# Patient Record
Sex: FEMALE | URBAN - METROPOLITAN AREA
[De-identification: names, ages, dates, MRNs, and addresses within clinical notes are randomized per-mention and may not be internally consistent; named-entity substitution may affect disease eponyms.]

---

## 2020-03-13 ENCOUNTER — APPOINTMENT (OUTPATIENT)
Age: 20
Setting detail: DERMATOLOGY
End: 2020-03-14

## 2020-03-13 DIAGNOSIS — B76.9 HOOKWORM DISEASE, UNSPECIFIED: ICD-10-CM

## 2020-03-13 PROCEDURE — OTHER PRESCRIPTION: OTHER

## 2020-03-13 PROCEDURE — 99203 OFFICE O/P NEW LOW 30 MIN: CPT

## 2020-03-13 RX ORDER — PERMETHRIN 50 MG/G
CREAM TOPICAL
Qty: 1 | Refills: 0 | Status: ERX | COMMUNITY
Start: 2020-03-13

## 2020-03-23 ENCOUNTER — APPOINTMENT (OUTPATIENT)
Age: 20
Setting detail: DERMATOLOGY
End: 2020-03-24

## 2020-03-23 DIAGNOSIS — B76.9 HOOKWORM DISEASE, UNSPECIFIED: ICD-10-CM

## 2020-03-23 PROCEDURE — OTHER ADDITIONAL NOTES: OTHER

## 2020-03-23 PROCEDURE — 99213 OFFICE O/P EST LOW 20 MIN: CPT

## 2020-03-23 PROCEDURE — OTHER PRESCRIPTION: OTHER

## 2020-03-23 RX ORDER — TRIAMCINOLONE ACETONIDE 1 MG/G
CREAM TOPICAL
Qty: 1 | Refills: 0 | Status: ERX | COMMUNITY
Start: 2020-03-23

## 2020-03-23 NOTE — PROCEDURE: ADDITIONAL NOTES
Additional Notes: Her dad he states rash is better. States lesions are lightning and no new growth or movement. Will send topical steroid see if we can lighten it up faster. Has finished oral medications and tolerated fine.
Detail Level: Simple

## 2020-04-02 ENCOUNTER — APPOINTMENT (OUTPATIENT)
Age: 20
Setting detail: DERMATOLOGY
End: 2020-04-03

## 2020-04-02 DIAGNOSIS — B76.9 HOOKWORM DISEASE, UNSPECIFIED: ICD-10-CM

## 2020-04-02 PROCEDURE — 99213 OFFICE O/P EST LOW 20 MIN: CPT

## 2020-04-02 PROCEDURE — OTHER ADDITIONAL NOTES: OTHER

## 2020-04-02 PROCEDURE — OTHER PRESCRIPTION: OTHER

## 2020-04-02 RX ORDER — TRIAMCINOLONE ACETONIDE 1 MG/G
CREAM TOPICAL
Qty: 1 | Refills: 0 | Status: CANCELLED

## 2020-04-09 ENCOUNTER — APPOINTMENT (OUTPATIENT)
Age: 20
Setting detail: DERMATOLOGY
End: 2020-04-12

## 2020-04-09 DIAGNOSIS — B76.9 HOOKWORM DISEASE, UNSPECIFIED: ICD-10-CM

## 2020-04-09 PROCEDURE — 99213 OFFICE O/P EST LOW 20 MIN: CPT

## 2020-04-09 PROCEDURE — OTHER ADDITIONAL NOTES: OTHER

## 2020-04-09 NOTE — PROCEDURE: ADDITIONAL NOTES
Additional Notes: No new lesions. Just finished second course of oral medication. Will recheck next week.
Detail Level: Simple

## 2020-04-16 ENCOUNTER — APPOINTMENT (OUTPATIENT)
Age: 20
Setting detail: DERMATOLOGY
End: 2020-04-16

## 2020-04-16 DIAGNOSIS — B76.9 HOOKWORM DISEASE, UNSPECIFIED: ICD-10-CM

## 2020-04-16 PROCEDURE — OTHER PRESCRIPTION: OTHER

## 2020-04-16 PROCEDURE — 99213 OFFICE O/P EST LOW 20 MIN: CPT

## 2020-04-16 PROCEDURE — OTHER ADDITIONAL NOTES: OTHER

## 2020-04-16 RX ORDER — PERMETHRIN 50 MG/G
CREAM TOPICAL
Qty: 1 | Refills: 0 | Status: ERX

## 2020-04-16 ASSESSMENT — LOCATION DETAILED DESCRIPTION DERM
LOCATION DETAILED: RIGHT VENTRAL PROXIMAL FOREARM
LOCATION DETAILED: LEFT VENTRAL PROXIMAL FOREARM

## 2020-04-16 ASSESSMENT — LOCATION ZONE DERM: LOCATION ZONE: ARM

## 2020-04-16 ASSESSMENT — LOCATION SIMPLE DESCRIPTION DERM
LOCATION SIMPLE: LEFT FOREARM
LOCATION SIMPLE: RIGHT FOREARM

## 2020-04-16 NOTE — PROCEDURE: ADDITIONAL NOTES
Additional Notes: Mom states existing lesions are not spreading, but has new lesion to leg. Will treat aggressively x2 weeks if not resolved will rtc for biopsy.
Detail Level: Simple

## 2020-04-30 ENCOUNTER — APPOINTMENT (OUTPATIENT)
Age: 20
Setting detail: DERMATOLOGY
End: 2020-04-30

## 2020-04-30 DIAGNOSIS — B76.9 HOOKWORM DISEASE, UNSPECIFIED: ICD-10-CM

## 2020-04-30 PROCEDURE — 99213 OFFICE O/P EST LOW 20 MIN: CPT | Mod: 95

## 2020-04-30 PROCEDURE — OTHER ADDITIONAL NOTES: OTHER

## 2020-04-30 NOTE — PROCEDURE: ADDITIONAL NOTES
Additional Notes: Per mom she has a few new spots and no improvement with medications. Disc need to biopsy. Will punch biopsy Wednesday morning.
Detail Level: Simple

## 2020-05-06 ENCOUNTER — APPOINTMENT (OUTPATIENT)
Dept: URBAN - METROPOLITAN AREA CLINIC 272 | Age: 20
Setting detail: DERMATOLOGY
End: 2020-05-06

## 2020-05-06 DIAGNOSIS — B76.9 HOOKWORM DISEASE, UNSPECIFIED: ICD-10-CM

## 2020-05-06 DIAGNOSIS — L30.9 DERMATITIS, UNSPECIFIED: ICD-10-CM

## 2020-05-06 PROCEDURE — OTHER ADDITIONAL NOTES: OTHER

## 2020-05-06 PROCEDURE — 11105 PUNCH BX SKIN EA SEP/ADDL: CPT

## 2020-05-06 PROCEDURE — OTHER BIOPSY BY PUNCH METHOD: OTHER

## 2020-05-06 PROCEDURE — 11104 PUNCH BX SKIN SINGLE LESION: CPT

## 2020-05-06 PROCEDURE — 99213 OFFICE O/P EST LOW 20 MIN: CPT | Mod: 25

## 2020-05-06 ASSESSMENT — LOCATION ZONE DERM: LOCATION ZONE: ARM

## 2020-05-06 ASSESSMENT — LOCATION DETAILED DESCRIPTION DERM
LOCATION DETAILED: LEFT DISTAL DORSAL FOREARM
LOCATION DETAILED: RIGHT DISTAL RADIAL DORSAL FOREARM

## 2020-05-06 ASSESSMENT — LOCATION SIMPLE DESCRIPTION DERM
LOCATION SIMPLE: RIGHT FOREARM
LOCATION SIMPLE: LEFT FOREARM

## 2020-05-06 NOTE — PROCEDURE: BIOPSY BY PUNCH METHOD
Billing Type: Third-Party Bill
Consent: Written consent was obtained and risks were reviewed including but not limited to scarring, infection, bleeding, scabbing, incomplete removal, nerve damage and allergy to anesthesia.
Render Post-Care Instructions In Note?: no
Hemostasis: None
Information: Selecting Yes will display possible errors in your note based on the variables you have selected. This validation is only offered as a suggestion for you. PLEASE NOTE THAT THE VALIDATION TEXT WILL BE REMOVED WHEN YOU FINALIZE YOUR NOTE. IF YOU WANT TO FAX A PRELIMINARY NOTE YOU WILL NEED TO TOGGLE THIS TO 'NO' IF YOU DO NOT WANT IT IN YOUR FAXED NOTE.
Punch Size In Mm: 5
Biopsy Type: H and E
Size Of Lesion In Cm (Optional): 0
Suture Removal: 14 days
Anesthesia Type: 1% lidocaine with epinephrine
Post-Care Instructions: I reviewed with the patient in detail post-care instructions. Patient is to keep the biopsy site dry overnight, and then apply bacitracin twice daily until healed. Patient may apply hydrogen peroxide soaks to remove any crusting.
Validate Note Data (See Information Below): Yes
Notification Instructions: Patient will be notified of biopsy results. However, patient instructed to call the office if not contacted within 2 weeks.
Detail Level: Detailed
Anesthesia Volume In Cc (Will Not Render If 0): 0.5
Epidermal Sutures: 4-0 Nylon
Home Suture Removal Text: Patient was provided a home suture removal kit and will remove their sutures at home.  If they have any questions or difficulties they will call the office.
Dressing: bandage
Wound Care: Petrolatum

## 2020-05-06 NOTE — PROCEDURE: ADDITIONAL NOTES
Detail Level: Simple
Additional Notes: Will wait to treat until biopsy results back. Can cont TAC cream for now.

## 2022-12-13 ENCOUNTER — APPOINTMENT (OUTPATIENT)
Dept: URBAN - METROPOLITAN AREA CLINIC 272 | Age: 22
Setting detail: DERMATOLOGY
End: 2022-12-23

## 2022-12-13 DIAGNOSIS — Q80.8 OTHER CONGENITAL ICHTHYOSIS: ICD-10-CM

## 2022-12-13 DIAGNOSIS — L20.89 OTHER ATOPIC DERMATITIS: ICD-10-CM

## 2022-12-13 PROCEDURE — OTHER TREATMENT REGIMEN: OTHER

## 2022-12-13 PROCEDURE — OTHER COUNSELING: OTHER

## 2022-12-13 PROCEDURE — OTHER PRESCRIPTION: OTHER

## 2022-12-13 PROCEDURE — 99214 OFFICE O/P EST MOD 30 MIN: CPT

## 2022-12-13 RX ORDER — TRIAMCINOLONE ACETONIDE 1 MG/G
OINTMENT TOPICAL
Qty: 80 | Refills: 0 | Status: ERX | COMMUNITY
Start: 2022-12-13

## 2022-12-13 ASSESSMENT — LOCATION DETAILED DESCRIPTION DERM
LOCATION DETAILED: RIGHT PROXIMAL DORSAL FOREARM
LOCATION DETAILED: LEFT PROXIMAL POSTERIOR UPPER ARM
LOCATION DETAILED: LEFT PROXIMAL DORSAL FOREARM
LOCATION DETAILED: RIGHT PROXIMAL POSTERIOR UPPER ARM
LOCATION DETAILED: STERNAL NOTCH
LOCATION DETAILED: LEFT INFERIOR LATERAL NECK

## 2022-12-13 ASSESSMENT — LOCATION SIMPLE DESCRIPTION DERM
LOCATION SIMPLE: RIGHT FOREARM
LOCATION SIMPLE: LEFT FOREARM
LOCATION SIMPLE: LEFT POSTERIOR UPPER ARM
LOCATION SIMPLE: RIGHT POSTERIOR UPPER ARM
LOCATION SIMPLE: LEFT ANTERIOR NECK
LOCATION SIMPLE: CHEST

## 2022-12-13 ASSESSMENT — ITCH NUMERIC RATING SCALE: HOW SEVERE IS YOUR ITCHING?: 2

## 2022-12-13 ASSESSMENT — BSA ECZEMA: % BODY COVERED IN ECZEMA: 20

## 2022-12-13 ASSESSMENT — LOCATION ZONE DERM
LOCATION ZONE: TRUNK
LOCATION ZONE: ARM
LOCATION ZONE: NECK

## 2022-12-13 ASSESSMENT — SEVERITY ASSESSMENT 2020: SEVERITY 2020: MILD

## 2022-12-13 NOTE — PROCEDURE: TREATMENT REGIMEN
Samples Given: Gentle cleansers and washes given to patient in clinic. Eucerin wash and creams samples given to patient in clinic. Recommended she not use anything with perfumes.
Detail Level: Zone
Plan: Change Triamcinolone cream to ointment and use bid 10-14 days. Do not use on the face.

## 2022-12-13 NOTE — HPI: RASH
What Type Of Note Output Would You Prefer (Optional)?: Bullet Format
How Severe Is Your Rash?: mild
Is This A New Presentation, Or A Follow-Up?: Rash
Additional History: Pt states they went to the walk in clinic on 11/06/2022 they prescribed her triamcinolone. Pt states they applied triamcinolone on her left side of her neck pt state it burns so they discontinued the triamcinolone on neck.